# Patient Record
Sex: FEMALE | Race: WHITE | Employment: FULL TIME | URBAN - METROPOLITAN AREA
[De-identification: names, ages, dates, MRNs, and addresses within clinical notes are randomized per-mention and may not be internally consistent; named-entity substitution may affect disease eponyms.]

---

## 2017-06-17 ENCOUNTER — HOSPITAL ENCOUNTER (OUTPATIENT)
Age: 52
Discharge: HOME | End: 2017-06-17
Attending: FAMILY MEDICINE
Payer: COMMERCIAL

## 2017-06-17 VITALS
RESPIRATION RATE: 16 BRPM | HEART RATE: 95 BPM | SYSTOLIC BLOOD PRESSURE: 132 MMHG | OXYGEN SATURATION: 98 % | DIASTOLIC BLOOD PRESSURE: 67 MMHG | TEMPERATURE: 98 F

## 2017-06-17 DIAGNOSIS — Z76.0 ENCOUNTER FOR MEDICATION REFILL: ICD-10-CM

## 2017-06-17 DIAGNOSIS — B37.2 MONILIAL INTERTRIGO: ICD-10-CM

## 2017-06-17 DIAGNOSIS — L40.9 PSORIASIS: Primary | ICD-10-CM

## 2017-06-17 PROCEDURE — 99203 OFFICE O/P NEW LOW 30 MIN: CPT

## 2017-06-17 PROCEDURE — 99204 OFFICE O/P NEW MOD 45 MIN: CPT

## 2017-06-17 RX ORDER — NYSTATIN 10B UNIT
POWDER (EA) MISCELLANEOUS
Qty: 1 BOTTLE | Refills: 1 | Status: SHIPPED | OUTPATIENT
Start: 2017-06-17 | End: 2018-01-11 | Stop reason: ALTCHOICE

## 2017-06-17 RX ORDER — PREDNISONE 10 MG/1
TABLET ORAL
Qty: 20 TABLET | Refills: 0 | Status: SHIPPED | OUTPATIENT
Start: 2017-06-17 | End: 2017-07-07 | Stop reason: ALTCHOICE

## 2017-06-17 RX ORDER — CALCIPOTRIENE 0.05 MG/ML
SOLUTION TOPICAL
Qty: 60 ML | Refills: 1 | Status: SHIPPED | OUTPATIENT
Start: 2017-06-17 | End: 2017-07-11

## 2017-06-17 RX ORDER — SALICYLIC ACID 6 MG/100ML
SHAMPOO TOPICAL
Qty: 177 ML | Refills: 1 | Status: SHIPPED | OUTPATIENT
Start: 2017-06-17 | End: 2018-01-11 | Stop reason: ALTCHOICE

## 2017-06-17 NOTE — ED PROVIDER NOTES
Patient Seen in: [facility] In [address] [place] [address] Street,[address]    History   Patient presents with:  Derm Problem    Stated Complaint: SKIN PROBLEM     HPI    This 53-year-old female presents to the office for evaluation of rash which is located underneath both br Alcohol Use: Yes                Comment: Wine Nightly      Review of Systems    Positive for stated complaint: SKIN PROBLEM   Other systems are as noted in HPI. Constitutional and vital signs reviewed.       All other systems reviewed and negative e her trip.       Disposition and Plan     Clinical Impression:  Psoriasis  (primary encounter diagnosis)  Monilial intertrigo  Encounter for medication refill    Disposition:  Discharge    Follow-up:  Anthony Hall, [address]  [address] [place] [address]

## 2017-06-17 NOTE — ED INITIAL ASSESSMENT (HPI)
Pt states she has psoriasis. Rash on the bra line and cracking, spreading getting worse.  Tried otc salisyclic acid bu no relief

## 2017-08-08 PROCEDURE — 80074 ACUTE HEPATITIS PANEL: CPT | Performed by: DERMATOLOGY

## 2017-08-08 PROCEDURE — 87389 HIV-1 AG W/HIV-1&-2 AB AG IA: CPT | Performed by: DERMATOLOGY

## 2017-08-08 PROCEDURE — 36415 COLL VENOUS BLD VENIPUNCTURE: CPT | Performed by: DERMATOLOGY

## 2017-08-08 PROCEDURE — 86480 TB TEST CELL IMMUN MEASURE: CPT | Performed by: DERMATOLOGY

## 2018-08-14 PROCEDURE — 86480 TB TEST CELL IMMUN MEASURE: CPT | Performed by: INTERNAL MEDICINE

## (undated) NOTE — ED AVS SNAPSHOT
[facility] in [address] [place] [address] Drive,[address]    [address] [place] [address]    Phone:  [phone]    Fax:  [address]   MRN: [id number]    Department:  [facility] END   Date of Visit:  6/17/2017 [address] [address]     Phone:  [phone]    - Calcipotriene 0.005 % Soln  - Nystatin Powd  - predniSONE 10 MG Tabs  - Salicylic Acid 6 % Sham              Discharge Instructions       Apply the nystatin powder to the areas under a detailed feedback survey mailed to them a week after the visit. If you receive this, we would really appreciate it if you could take the time to complete it. Thank you! You were examined and treated today on an urgent basis only.   This was not a walker [facility] ([address]) [facility] [address] [facility]. ([address]) [facility] [address] ([address]) [address]  [employer] ([address] medical emergencies, dial 911.